# Patient Record
Sex: FEMALE | Race: BLACK OR AFRICAN AMERICAN | NOT HISPANIC OR LATINO | Employment: UNEMPLOYED | URBAN - METROPOLITAN AREA
[De-identification: names, ages, dates, MRNs, and addresses within clinical notes are randomized per-mention and may not be internally consistent; named-entity substitution may affect disease eponyms.]

---

## 2017-10-31 ENCOUNTER — HISTORICAL (OUTPATIENT)
Dept: RADIOLOGY | Facility: HOSPITAL | Age: 57
End: 2017-10-31

## 2020-07-23 ENCOUNTER — HISTORICAL (OUTPATIENT)
Dept: ADMINISTRATIVE | Facility: HOSPITAL | Age: 60
End: 2020-07-23

## 2020-10-12 ENCOUNTER — HISTORICAL (OUTPATIENT)
Dept: ADMINISTRATIVE | Facility: HOSPITAL | Age: 60
End: 2020-10-12

## 2020-11-09 ENCOUNTER — HISTORICAL (OUTPATIENT)
Dept: ADMINISTRATIVE | Facility: HOSPITAL | Age: 60
End: 2020-11-09

## 2021-12-16 ENCOUNTER — HISTORICAL (OUTPATIENT)
Dept: ADMINISTRATIVE | Facility: HOSPITAL | Age: 61
End: 2021-12-16

## 2022-04-10 ENCOUNTER — HISTORICAL (OUTPATIENT)
Dept: ADMINISTRATIVE | Facility: HOSPITAL | Age: 62
End: 2022-04-10
Payer: COMMERCIAL

## 2022-04-29 VITALS
HEIGHT: 63 IN | DIASTOLIC BLOOD PRESSURE: 82 MMHG | HEIGHT: 63 IN | BODY MASS INDEX: 25.52 KG/M2 | SYSTOLIC BLOOD PRESSURE: 123 MMHG | DIASTOLIC BLOOD PRESSURE: 77 MMHG | WEIGHT: 139.56 LBS | SYSTOLIC BLOOD PRESSURE: 111 MMHG | WEIGHT: 144 LBS | DIASTOLIC BLOOD PRESSURE: 81 MMHG | WEIGHT: 139.56 LBS | BODY MASS INDEX: 24.73 KG/M2 | BODY MASS INDEX: 24.73 KG/M2 | SYSTOLIC BLOOD PRESSURE: 118 MMHG | BODY MASS INDEX: 24.73 KG/M2 | HEIGHT: 63 IN | HEIGHT: 63 IN | WEIGHT: 139.56 LBS

## 2022-05-02 NOTE — HISTORICAL [facility]
This is a historical note converted from [employer]. Formatting and pictures may have been removed.  Please reference [employer] for original formatting and attached multimedia. Chief Complaint  F/u L TKA 10/27/20, pt wants to check her knee before possibly losing insurance, pt states she gets pain sometimes when she stands or walk for a period of time, requesting voteran gel for pain, asking if she should wear a knee brace for pain  History of Present Illness  Patient presents to clinic for repeat evaluation of left knee.? She had a TKA on 10/27/2020.? She has intermittent pain when she stands or walks for period of time?and would like reassurance that her knee is okay.? She would like a prescription for Voltaren gel.? She denies any injuries or inciting event since her last visit.?  Physical Exam  Vitals & Measurements  T:?36.5? ?C (Oral)?  HT:?160.00?cm? WT:?63.300?kg? BMI:?24.73?  The patient is well-nourished well-developed, no apparent distress pleasant and cooperative. Examination of the left lower extremity compartments are soft and warm. Skin is intact. There is no evidence of DVT or infection. There is no effusion. There is no erythema. ?Nontender to palpation,?left knee range of motion is 0-120. The knee is stable to exam with various and valgus stressing. ?Incision is well-healed. ?Patella tracking appropriately. Neurovascularly intact distally.? X-rays:?3 views of the?left knee demonstrate?arthroplasty hardware in appropriate?placement without signs of loosening or infection.  Assessment/Plan  1.?History of total left knee replacement?Z96.652  ?Physical exam and imaging findings cussed with patient.? She has done very well with her?left knee.? She will continue low impact activities.? Prescription for Voltaren gel given today.? I would like the patient to call with any problems or difficulties.  Ordered:  diclofenac topical, 1 rodriguez, TOP, QID, PRN PRN as needed for pain, # 100 gm, 0 Refill(s), Pharmacy: [id number], 160, cm, Height/Length Dosing, 12/16/21 9:12:00 CST, 63.3, kg, Weight Dosing, 12/16/21 9:12:00 CST  Office/Outpatient Visit Level 3 Established [id number] PC, History of total left knee replacement, Orthopaedics Clinic, 12/16/21 9:25:00 CST  ?   Problem List/Past Medical History  Ongoing  Arthritis  Clearing throat - hawking  Dysphagia  Fibromyalgia  Gastroesophageal reflux disease  High cholesterol  Hypercholesterolemia  Visual impairment  Historical  No qualifying data  Procedure/Surgical History  KWAN B[place]REGO Total Knee Arthroplasty (Left) (10/27/2020)  Replacement of Left Knee Joint with Synthetic Substitute, Uncemented, Open Approach (10/27/2020)  Robotic Assisted Procedure of Lower Extremity, Open Approach (10/27/2020)  RIGHT KNEE REPLACEMENT (2015)  Cholecystectomy  ESOPHAGUS STRETCHED X2   Medications  aspirin 81 mg oral Delayed Release (EC) tablet, 81 mg= 1 tab(s), Oral, BID  Co Q-10 100 mg oral capsule, 100 mg, Oral, Daily  diclofenac 1% topical gel, 1 rodriguez, TOP, QID, PRN  diclofenac sodium 75 mg oral delayed release tablet, 75 mg= 1 tab(s), Oral, BID  gabapentin 600 mg oral tablet, 600 mg= 1 tab(s), Oral, BID,? ?Still taking, not as prescribed: Taking only one tab BID  Norco 5 mg-325 mg oral tablet, 1 tab(s), Oral, q6hr, PRN,? ?Not taking: duplicate  Norco 5 mg-325 mg oral tablet, 1 tab(s), Oral, q6hr, PRN,? ?Not taking  pravastatin 40 mg oral tablet, 40 mg= 1 tab(s), Oral, Daily  Prempro 0.625 mg-5 mg oral tablet, 1 tab(s), Oral, Daily  sulfamethoxazole-trimethoprim 800 mg-160 mg oral tablet, 1 tab(s), Oral, BID,? ?Not Taking, Completed Rx  traMADol 50 mg oral tablet, 50 mg= 1 tab(s), Oral, q4hr,? ?Not Taking, Completed Rx  triamcinolone 0.5% topical cream, 1 rodriguez, TOP, BID  Tylenol, 500 mg= 1 tab(s), Oral, q4hr  Allergies  No Known Allergies  Social History  Abuse/Neglect  No, No, Yes, 12/16/2021  Employment/School  Disabled, 10/13/2020  Home/Environment  Lives with Spouse. Living situation:  Home/Independent., 09/14/2020  Nutrition/Health  Regular, 10/13/2020  Sexual  Sexually active: Yes., 10/13/2020  Tobacco  Never (less than 100 in lifetime), N/A, 12/16/2021  Family History  Cancer: Father.  Diabetes mellitus type 2: Mother, Sister and Brother.  Heart failure.: Mother.  Renal failure syndrome.: Mother.  Rheumatoid arthritis: Mother.  Health Maintenance  Health Maintenance  ???Pending?(in the next year)  ??? ??Due?  ??? ? ? ?Aspirin Therapy for CVD Prevention due??10/29/21??and every 1??year(s)  ??? ? ? ?Medicare Annual Wellness Exam due??12/16/21??and every 1??year(s)  ??? ? ? ?Tetanus Vaccine due??12/16/21??and every 10??year(s)  ??? ? ? ?Zoster Vaccine due??12/16/21??Unknown Frequency  ??? ??Due In Future?  ??? ? ? ?Obesity Screening not due until??01/01/22??and every 1??year(s)  ??? ? ? ?Alcohol Misuse Screening not due until??01/02/22??and every 1??year(s)  ??? ? ? ?Diabetes Screening not due until??09/27/22??and every 1??year(s)  ??? ? ? ?Breast Cancer Screening not due until??10/06/22??and every 2??year(s)  ??? ? ? ?Blood Pressure Screening not due until??10/13/22??and every 1??year(s)  ???Satisfied?(in the past 1 year)  ??? ??Satisfied?  ??? ? ? ?ADL Screening on??12/16/21.??Satisfied by Jackelin Bingham  ??? ? ? ?Alcohol Misuse Screening on??05/13/21.??Satisfied by Mariely Russell L. L.  ??? ? ? ?Blood Pressure Screening on??05/13/21.??Satisfied by Mariely Russell L. L.  ??? ? ? ?Body Mass Index Check on??12/16/21.??Satisfied by Jackelin Bingham  ??? ? ? ?Depression Screening on??12/16/21.??Satisfied by Jackelin Bingham  ??? ? ? ?Influenza Vaccine on??12/16/21.??Satisfied by Jackelin Bingham  ??? ? ? ?Obesity Screening on??12/16/21.??Satisfied by Jackelin Bingham  ?

## 2022-05-02 NOTE — HISTORICAL [facility]
This is a historical note converted from [employer]. Formatting and pictures may have been removed.  Please reference [employer] for original formatting and attached multimedia. Chief Complaint  Pt c/o right shoulder pain that radiates down into right arm. Pt had right shoulder injection 5/28/2020. Pt also c/o left knee pain and weakness and is ambulating with cane today.  History of Present Illness  Patient returns today for repeat exam. ?Patient continues to have right shoulder pain and left knee pain. ?Patient states her knee continues to give out. ?She was told in the past she needs a knee replacement. ?She tried rest medication, also had an injection earlier this year without relief. ?She is ambulating with her cane.? She also had a injection of her right shoulder last visit, continues to have pain especially with overhead activities.  Physical Exam  Vitals & Measurements  T:?98.0? ?F (Oral)? HR:?77(Peripheral)? BP:?118/82?  HT:?160?cm? WT:?65.31?kg? BMI:?25.51?  Left lower extremity compartments are soft and warm. ?Skin is intact. ?There is no signs or symptoms of DVT or infection. ?She does have a valgus deformity?of her left knee. ?She is almost correctable to neutral. ?She does have a grinding sensation with flexion extension.? Her motion is 5 to 105 degrees. ?She is otherwise stable to stressing she does walk antalgic gait she is neurovascular tact distally. ?Examination the right shoulder?she is tender along the lateral aspect mildly positive Hawkin sign negative drop arm she is able to forward flex and abduct 140 degrees she is otherwise stable to stressing she is neurovascular tact distally.? X-rays 3 views left knee demonstrates?severe lateral compartment arthritic changes?with bone spurring, sclerosis.  Assessment/Plan  1.?Osteoarthritis of left knee?M17.12  ?At this time we discussed her physical exam and x-ray findings. ?In regards to her left knee, she has failed 1 conservative treatments. ?She would like  to proceed with a knee replacement,?the risk-benefit return discussed with the patient in detail?downtime rehab process, all questions were answered.? She has had 1 on the right knee and has done well. ?We will set this up at her convenience. ?In regards to her right shoulder she will continue?her?therapy exercises.  Ordered:  Clinic Follow up, *Est. 07/30/20 3:00:00 CDT, Order for future visit, Osteoarthritis of left knee  Right rotator cuff tendonitis  Impingement syndrome of right shoulder region, Orthopaedics  Office/Outpatient Visit Level 3 Established [id number] PC, Osteoarthritis of left knee  Right rotator cuff tendonitis  Impingement syndrome of right shoulder region, [facility], 07/23/20 8:34:00 CDT  Schedule Surgery, left tka, preop next week, 07/23/20 8:34:00 CDT, Osteoarthritis of left knee  Right rotator cuff tendonitis  Impingement syndrome of right shoulder region  ?  2.?Right rotator cuff tendonitis?M75.81  Ordered:  Clinic Follow up, *Est. 07/30/20 3:00:00 CDT, Order for future visit, Osteoarthritis of left knee  Right rotator cuff tendonitis  [facility]  Office/Outpatient Visit Level 3 Established [id number] PC, Osteoarthritis of left knee  Right rotator cuff tendonitis  Impingement syndrome of right shoulder region, [facility], 07/23/20 8:34:00 CDT  Schedule Surgery, left tka, preop next week, 07/23/20 8:34:00 CDT, Osteoarthritis of left knee  Right rotator cuff tendonitis  Impingement syndrome of right shoulder region  ?  3.?Impingement syndrome of right shoulder region?M75.41  Ordered:  Clinic Follow up, *Est. 07/30/20 3:00:00 CDT, Order for future visit, Osteoarthritis of left knee  Right rotator cuff tendonitis  [facility]  Office/Outpatient Visit Level 3 Established [id number] PC, Osteoarthritis of left knee  Right rotator cuff tendonitis  Impingement syndrome of  right shoulder region, Orthopaedics Clinic, 07/23/20 8:34:00 CDT  Schedule Surgery, left tka, preop next week, 07/23/20 8:34:00 CDT, Osteoarthritis of left knee  Right rotator cuff tendonitis  Impingement syndrome of right shoulder region  ?  Orders:  XR Knee Left 3 Views, Routine, 07/23/20 8:13:00 CDT, None, Ambulatory, Rad Type, Left knee pain, Not Scheduled, 07/23/20 8:13:00 CDT  Referrals  Clinic Follow up, *Est. 07/30/20 3:00:00 CDT, Order for future visit, Osteoarthritis of left knee  Right rotator cuff tendonitis  [facility]   Problem List/Past Medical History  Ongoing  Fibromyalgia  High cholesterol  Historical  No qualifying data  Procedure/Surgical History  RIGHT KNEE REPLACEMENT (2015)  Cholecystectomy  ESOPHAGUS STRETCHED X2   Medications  atorvastatin 80 mg oral tablet, 80 mg= 1 tab(s), Oral, qPM,? ?Not taking  gabapentin 400 mg oral capsule, 400 mg= 1 cap(s), Oral, TID  meloxicam 7.5 mg oral tablet, 7.5 mg= 1 tab(s), Oral, Daily  pravastatin 40 mg oral tablet, 40 mg= 1 tab(s), Oral, Daily  triamcinolone 0.5% topical cream, 1 rodriguez, TOP, BID  Allergies  No Known Allergies  Social History  Abuse/Neglect  No, 07/23/2020  No, No, Yes, 05/28/2020  Tobacco  Never (less than 100 in lifetime), N/A, 07/23/2020  Never (less than 100 in lifetime), N/A, 05/28/2020  Family History  Cancer: Father.  Diabetes mellitus type 2: Mother, Sister and Brother.  Heart failure.: Mother.  Renal failure syndrome.: Mother.  Rheumatoid arthritis: Mother.  Health Maintenance  Health Maintenance  ???Pending?(in the next year)  ??? ??Due?  ??? ? ? ?ADL Screening due??07/23/20??and every 1??year(s)  ??? ? ? ?Aspirin Therapy for CVD Prevention due??07/23/20??and every 1??year(s)  ??? ? ? ?Colorectal Screening due??07/23/20??and every 10??year(s)  ??? ? ? ?Depression Screening due??07/23/20??and every?  ??? ? ? ?Influenza Vaccine due??07/23/20??and every?  ??? ? ? ?Tetanus Vaccine  due??07/23/20??and every 10??year(s)  ??? ? ? ?Zoster Vaccine due??07/23/20??and every?  ??? ??Due In Future?  ??? ? ? ?Obesity Screening not due until??01/01/21??and every 1??year(s)  ??? ? ? ?Alcohol Misuse Screening not due until??01/02/21??and every 1??year(s)  ??? ? ? ?Diabetes Screening not due until??03/10/21??and every 1??year(s)  ??? ? ? ?Blood Pressure Screening not due until??05/28/21??and every 1??year(s)  ??? ? ? ?Body Mass Index Check not due until??05/28/21??and every 1??year(s)  ???Satisfied?(in the past 1 year)  ??? ??Satisfied?  ??? ? ? ?Alcohol Misuse Screening on??05/28/20.??Satisfied by Belkis Vera LPN  ??? ? ? ?Blood Pressure Screening on??07/23/20.??Satisfied by Ale Fabian  ??? ? ? ?Body Mass Index Check on??07/23/20.??Satisfied by Ale Fabian  ??? ? ? ?Obesity Screening on??07/23/20.??Satisfied by Ale Fabian  ?

## 2022-05-12 ENCOUNTER — HOSPITAL ENCOUNTER (OUTPATIENT)
Dept: RADIOLOGY | Facility: CLINIC | Age: 62
Discharge: HOME | End: 2022-05-12
Attending: ORTHOPAEDIC SURGERY
Payer: COMMERCIAL

## 2022-05-12 ENCOUNTER — OFFICE VISIT (OUTPATIENT)
Dept: ORTHOPEDICS | Facility: CLINIC | Age: 62
End: 2022-05-12
Payer: COMMERCIAL

## 2022-05-12 VITALS
DIASTOLIC BLOOD PRESSURE: 77 MMHG | WEIGHT: 142 LBS | BODY MASS INDEX: 25.16 KG/M2 | HEART RATE: 65 BPM | HEIGHT: 63 IN | SYSTOLIC BLOOD PRESSURE: 115 MMHG

## 2022-05-12 DIAGNOSIS — Z96.652 HISTORY OF LEFT KNEE REPLACEMENT: Primary | ICD-10-CM

## 2022-05-12 DIAGNOSIS — Z96.652 HISTORY OF TOTAL KNEE REPLACEMENT, LEFT: ICD-10-CM

## 2022-05-12 DIAGNOSIS — Z96.652 HISTORY OF LEFT KNEE REPLACEMENT: ICD-10-CM

## 2022-05-12 PROCEDURE — 99213 PR OFFICE/OUTPT VISIT, EST, LEVL III, 20-29 MIN: ICD-10-PCS | Mod: ,,, | Performed by: ORTHOPAEDIC SURGERY

## 2022-05-12 PROCEDURE — 99213 OFFICE O/P EST LOW 20 MIN: CPT | Mod: ,,, | Performed by: ORTHOPAEDIC SURGERY

## 2022-05-12 RX ORDER — GABAPENTIN 600 MG/1
600 TABLET ORAL 3 TIMES DAILY
COMMUNITY
Start: 2022-03-17

## 2022-05-12 RX ORDER — DICLOFENAC SODIUM 10 MG/G
GEL TOPICAL
COMMUNITY
Start: 2021-12-16

## 2022-05-12 NOTE — PROGRESS NOTES
Subjective:    CC: Pain of the Left Knee and Knee Pain (F/u L TKA 10/27/20, pt states her knee still give her trouble, c/o she gets pain/swelling after walking for awhile, tylenol/ice/topical gel for pain)       HPI:  Patient returns today for repeat exam.  Patient is when half years from her left total knee arthroplasty.  Patient states she is doing well, occasional soreness and swelling after long walking, especially after the last vestibule.  Otherwise she is not taking any medication, she does have a history of fibromyalgia, occasional Tylenol.    ROS: Refer to HPI for pertinent ROS. All other 12 point systems negative.    Objective:    Physical Exam:  Left lower extremity compartment soft and warm.  Skin is intact.  There is no signs symptoms of DVT or infection.  Her incision well healed her motion is 0-125 degrees she is stable to stressing patella tracked appropriately she is neurovascular intact distally.    Images:  X-rays three views left knee demonstrate a well-aligned total knee arthroplasty. Images Reviewed and discussed with patient.    Assessment:  1. History of left knee replacement  - X-Ray Knee Complete 4 or More Views Left; Future    2. History of total knee replacement, left        Plan:  At this time we discussed her physical exam and x-ray findings.  She has healed nicely she will continue low-impact activities we have discussed GI and  precautions.  I would like see back with any problems or difficulties.    Follow UP: Follow up if symptoms worsen or fail to improve.

## 2022-10-04 ENCOUNTER — TELEPHONE (OUTPATIENT)
Dept: ORTHOPEDICS | Facility: CLINIC | Age: 62
End: 2022-10-04
Payer: COMMERCIAL

## 2022-10-04 NOTE — TELEPHONE ENCOUNTER
PATIENT WOULD LIKE ANOTHER HANDICAP TAG. SHE STATES THAT YOU TOLD HER TO CALL WHEN SHE IS READY FOR ANOTHER TAG.       OKAY TO PROCEED?

## 2023-10-09 ENCOUNTER — TELEPHONE (OUTPATIENT)
Dept: ORTHOPEDICS | Facility: CLINIC | Age: 63
End: 2023-10-09
Payer: COMMERCIAL

## 2023-10-09 NOTE — TELEPHONE ENCOUNTER
Patient called stating that her handicap tag is about to . She stated that she needed another rx for it.     Okay to proceed?

## 2023-10-16 ENCOUNTER — HOSPITAL ENCOUNTER (OUTPATIENT)
Dept: RADIOLOGY | Facility: CLINIC | Age: 63
Discharge: HOME | End: 2023-10-16
Attending: ORTHOPAEDIC SURGERY
Payer: MEDICARE

## 2023-10-16 ENCOUNTER — OFFICE VISIT (OUTPATIENT)
Dept: ORTHOPEDICS | Facility: CLINIC | Age: 63
End: 2023-10-16
Payer: MEDICARE

## 2023-10-16 VITALS
SYSTOLIC BLOOD PRESSURE: 120 MMHG | WEIGHT: 142 LBS | HEIGHT: 63 IN | DIASTOLIC BLOOD PRESSURE: 82 MMHG | HEART RATE: 74 BPM | BODY MASS INDEX: 25.16 KG/M2

## 2023-10-16 DIAGNOSIS — Z96.652 HISTORY OF LEFT KNEE REPLACEMENT: Primary | ICD-10-CM

## 2023-10-16 DIAGNOSIS — Z96.652 HISTORY OF LEFT KNEE REPLACEMENT: ICD-10-CM

## 2023-10-16 PROCEDURE — 3074F SYST BP LT 130 MM HG: CPT | Mod: CPTII,,, | Performed by: ORTHOPAEDIC SURGERY

## 2023-10-16 PROCEDURE — 3008F PR BODY MASS INDEX (BMI) DOCUMENTED: ICD-10-PCS | Mod: CPTII,,, | Performed by: ORTHOPAEDIC SURGERY

## 2023-10-16 PROCEDURE — 1159F PR MEDICATION LIST DOCUMENTED IN MEDICAL RECORD: ICD-10-PCS | Mod: CPTII,,, | Performed by: ORTHOPAEDIC SURGERY

## 2023-10-16 PROCEDURE — 73564 XR KNEE COMP 4 OR MORE VIEWS LEFT: ICD-10-PCS | Mod: LT,,, | Performed by: ORTHOPAEDIC SURGERY

## 2023-10-16 PROCEDURE — 3079F PR MOST RECENT DIASTOLIC BLOOD PRESSURE 80-89 MM HG: ICD-10-PCS | Mod: CPTII,,, | Performed by: ORTHOPAEDIC SURGERY

## 2023-10-16 PROCEDURE — 1159F MED LIST DOCD IN RCRD: CPT | Mod: CPTII,,, | Performed by: ORTHOPAEDIC SURGERY

## 2023-10-16 PROCEDURE — 3079F DIAST BP 80-89 MM HG: CPT | Mod: CPTII,,, | Performed by: ORTHOPAEDIC SURGERY

## 2023-10-16 PROCEDURE — 1160F RVW MEDS BY RX/DR IN RCRD: CPT | Mod: CPTII,,, | Performed by: ORTHOPAEDIC SURGERY

## 2023-10-16 PROCEDURE — 1160F PR REVIEW ALL MEDS BY PRESCRIBER/CLIN PHARMACIST DOCUMENTED: ICD-10-PCS | Mod: CPTII,,, | Performed by: ORTHOPAEDIC SURGERY

## 2023-10-16 PROCEDURE — 99213 PR OFFICE/OUTPT VISIT, EST, LEVL III, 20-29 MIN: ICD-10-PCS | Mod: ,,, | Performed by: ORTHOPAEDIC SURGERY

## 2023-10-16 PROCEDURE — 3074F PR MOST RECENT SYSTOLIC BLOOD PRESSURE < 130 MM HG: ICD-10-PCS | Mod: CPTII,,, | Performed by: ORTHOPAEDIC SURGERY

## 2023-10-16 PROCEDURE — 3008F BODY MASS INDEX DOCD: CPT | Mod: CPTII,,, | Performed by: ORTHOPAEDIC SURGERY

## 2023-10-16 PROCEDURE — 73564 X-RAY EXAM KNEE 4 OR MORE: CPT | Mod: LT,,, | Performed by: ORTHOPAEDIC SURGERY

## 2023-10-16 PROCEDURE — 99213 OFFICE O/P EST LOW 20 MIN: CPT | Mod: ,,, | Performed by: ORTHOPAEDIC SURGERY

## 2023-10-16 RX ORDER — DICLOFENAC SODIUM 75 MG/1
TABLET, DELAYED RELEASE ORAL
COMMUNITY

## 2023-10-16 NOTE — PROGRESS NOTES
"Subjective:    CC: Follow-up of the Left Knee and Follow-up (L TKA 10/27/20 - pt would like a re-eval for a handicap tag. Pt states that her knee does hurt at time. )       HPI:  Patient comes in today for follow up for her left knee.  She is a proximally 3 years from her left total knee arthroplasty.  Patient states she is doing very well, she remains be very happy with her results.  She does have lower back issues, she is been treated outside facility, she denies any swelling or erythema about her left knee.  She is back to her normal activities.    ROS: Refer to HPI for pertinent ROS. All other 12 point systems negative.    Objective:  Vitals:    10/16/23 0901   BP: 120/82   Pulse: 74   Weight: 64.4 kg (142 lb)   Height: 5' 3" (1.6 m)        Physical Exam:  Left lower extremity compartment soft and warm.  Skin is intact.  There is no signs symptoms of DVT or infection.  Her incision well healed there is no joint effusion there is no swelling there is no erythema she is nontender her motion is 0-125 degrees she is stable to stressing, patella tracked appropriately, she walks with a normal gait, neurovascular intact distally.    Images:  X-rays four views of the left knee demonstrate a well-aligned total knee arthroplasty. Images Reviewed and discussed with patient.    Assessment:  1. History of left knee replacement  - X-Ray Knee Complete 4 or More Views Left; Future        Plan:  At this time we discussed her physical exam and x-ray findings.  She is healed nicely she will continue low-impact activities, I would like see back with any problems or difficulties.    Follow UP: No follow-ups on file.              "

## 2024-11-11 ENCOUNTER — OFFICE VISIT (OUTPATIENT)
Dept: ORTHOPEDICS | Facility: CLINIC | Age: 64
End: 2024-11-11
Payer: MEDICARE

## 2024-11-11 DIAGNOSIS — Z96.652 HISTORY OF LEFT KNEE REPLACEMENT: Primary | ICD-10-CM

## 2024-11-11 PROCEDURE — 1160F RVW MEDS BY RX/DR IN RCRD: CPT | Mod: CPTII,,, | Performed by: ORTHOPAEDIC SURGERY

## 2024-11-11 PROCEDURE — 99213 OFFICE O/P EST LOW 20 MIN: CPT | Mod: ,,, | Performed by: ORTHOPAEDIC SURGERY

## 2024-11-11 PROCEDURE — 1159F MED LIST DOCD IN RCRD: CPT | Mod: CPTII,,, | Performed by: ORTHOPAEDIC SURGERY

## 2024-11-11 NOTE — PROGRESS NOTES
Subjective:    CC: Follow-up of the Left Knee (L knee pain. Pt states she feels little hard knots and wanted to get that checked. Not having any pain or swelling. Does exercises in the morning. No recent falls.)       HPI:  Patient returns today for repeat exam.  Patient is a proximally 4 years now from her left total knee arthroplasty.  She denies any trauma or any injury.  She denies any pain or discomfort about her knee.  Patient states she felt a knot over her knee, nonpainful no swelling, and forgot to mention it last time, she was seen last year, had x-rays.  Patient states she is doing well.    ROS: Refer to HPI for pertinent ROS. All other 12 point systems negative.    Objective:  There were no vitals filed for this visit.     Physical Exam:  Left lower extremity compartment soft and warm.  Skin is intact.  There is no signs symptoms of DVT or infection.  Her incision is well healed there is no swelling there is no erythema there is no joint effusion her motion is 0-130 degrees she is stable to stressing.  She does have a small suspected palpable retained suture at the end of her patella area.  Proximally 1-2 mm.  No other masses appreciated no swelling.    Images: . Images Reviewed and discussed with patient.    Assessment:  1. History of left knee replacement        Plan:  At this time we discussed her physical exam and previous x-ray findings.  We will continue conservative treatment, she has healed very nicely from her knee replacement she will continue low-impact activities, I would like see back with any problems or difficulties.    Follow UP: No follow-ups on file.

## 2025-03-31 ENCOUNTER — HOSPITAL ENCOUNTER (OUTPATIENT)
Dept: RADIOLOGY | Facility: CLINIC | Age: 65
Discharge: HOME | End: 2025-03-31
Attending: ORTHOPAEDIC SURGERY
Payer: MEDICARE

## 2025-03-31 ENCOUNTER — OFFICE VISIT (OUTPATIENT)
Dept: ORTHOPEDICS | Facility: CLINIC | Age: 65
End: 2025-03-31
Payer: MEDICARE

## 2025-03-31 VITALS
HEART RATE: 68 BPM | HEIGHT: 63 IN | SYSTOLIC BLOOD PRESSURE: 131 MMHG | DIASTOLIC BLOOD PRESSURE: 84 MMHG | BODY MASS INDEX: 24.83 KG/M2 | WEIGHT: 140.13 LBS

## 2025-03-31 DIAGNOSIS — Z96.652 HISTORY OF LEFT KNEE REPLACEMENT: ICD-10-CM

## 2025-03-31 DIAGNOSIS — Z96.651 HISTORY OF TOTAL KNEE ARTHROPLASTY, RIGHT: ICD-10-CM

## 2025-03-31 DIAGNOSIS — M79.7 FIBROMYALGIA: ICD-10-CM

## 2025-03-31 DIAGNOSIS — Z96.652 HISTORY OF LEFT KNEE REPLACEMENT: Primary | ICD-10-CM

## 2025-03-31 DIAGNOSIS — M06.9 RHEUMATOID ARTHRITIS FLARE: ICD-10-CM

## 2025-03-31 PROCEDURE — 3079F DIAST BP 80-89 MM HG: CPT | Mod: CPTII,,, | Performed by: ORTHOPAEDIC SURGERY

## 2025-03-31 PROCEDURE — 1159F MED LIST DOCD IN RCRD: CPT | Mod: CPTII,,, | Performed by: ORTHOPAEDIC SURGERY

## 2025-03-31 PROCEDURE — 3075F SYST BP GE 130 - 139MM HG: CPT | Mod: CPTII,,, | Performed by: ORTHOPAEDIC SURGERY

## 2025-03-31 PROCEDURE — 1160F RVW MEDS BY RX/DR IN RCRD: CPT | Mod: CPTII,,, | Performed by: ORTHOPAEDIC SURGERY

## 2025-03-31 PROCEDURE — 99214 OFFICE O/P EST MOD 30 MIN: CPT | Mod: ,,, | Performed by: ORTHOPAEDIC SURGERY

## 2025-03-31 PROCEDURE — 3008F BODY MASS INDEX DOCD: CPT | Mod: CPTII,,, | Performed by: ORTHOPAEDIC SURGERY

## 2025-03-31 PROCEDURE — 73564 X-RAY EXAM KNEE 4 OR MORE: CPT | Mod: LT,,, | Performed by: ORTHOPAEDIC SURGERY

## 2025-03-31 RX ORDER — TIZANIDINE 4 MG/1
4 TABLET ORAL NIGHTLY
COMMUNITY
Start: 2025-03-17

## 2025-03-31 RX ORDER — METHYLPREDNISOLONE 4 MG/1
TABLET ORAL
Qty: 1 EACH | Refills: 0 | Status: SHIPPED | OUTPATIENT
Start: 2025-03-31

## 2025-03-31 RX ORDER — OLOPATADINE HYDROCHLORIDE OPHTHALMIC 2 MG/ML
1 SOLUTION OPHTHALMIC EVERY MORNING
COMMUNITY
Start: 2025-03-20

## 2025-03-31 NOTE — PROGRESS NOTES
"Subjective:    CC: Pain of the Left Knee (Pt states pain is constant, every day, through the night. Pt has to put pillow between her knees. Some swelling in the Lt leg. No recent falls or injuries. PT takes Tazidine to help sleep, but still waking up in the middle of night.)       HPI:  Patient comes in today complaining of bilateral knee pain bilateral shoulder pain, she states she is aching all over.  She does have a history of rheumatoid arthritis, she is currently looking for a rheumatologist.  She is not taking any pain medications.  She denies any trauma or specific injury.  She has had a previous left total knee arthroplasty a proximally 4-5 years ago.  She has tried rest medication without much relief.    ROS: Refer to HPI for pertinent ROS. All other 12 point systems negative.    Objective:  Vitals:    03/31/25 1100   BP: 131/84   BP Location: Right arm   Patient Position: Sitting   Pulse: 68   Weight: 63.5 kg (140 lb 1.6 oz)   Height: 5' 3" (1.6 m)        Physical Exam:  Bilateral lower extremities compartment soft and warm.  Skin is intact.  There is no signs symptoms of DVT or infection.  Previous incisions are well healed her motion is 0-120 on the left and right she is stable to stressing, patella tracked appropriately, neurovascular intact distally.    Images:  X-rays four views left knee demonstrate a well-aligned total knee arthroplasty. Images Reviewed and discussed with patient.    Assessment:  1. History of left knee replacement  - X-Ray Knee Complete 4 or More Views Left; Future  - Ambulatory Referral/Consult to Physical Therapy; Future    2. History of total knee arthroplasty, right  - Ambulatory Referral/Consult to Physical Therapy; Future    3. Fibromyalgia  - Ambulatory Referral/Consult to Physical Therapy; Future    4. Rheumatoid arthritis flare        Plan:  At this time we discussed her physical exam and x-ray findings.  She has done very well with her left knee replacement.  We have " discussed formal therapy for strengthening, we have also discussed a Medrol Dosepak with appropriate precautions.  We have also discussed referral to a rheumatologist, we will set this up at her convenience.  I would like see back in 3 months to see how she is progressing.    Follow UP: No follow-ups on file.

## 2025-04-09 DIAGNOSIS — M06.9 RHEUMATOID ARTHRITIS FLARE: ICD-10-CM

## 2025-04-09 DIAGNOSIS — M79.7 FIBROMYALGIA: Primary | ICD-10-CM

## 2025-07-31 ENCOUNTER — OFFICE VISIT (OUTPATIENT)
Dept: ORTHOPEDICS | Facility: CLINIC | Age: 65
End: 2025-07-31
Payer: MEDICARE

## 2025-07-31 VITALS
HEIGHT: 63 IN | HEART RATE: 66 BPM | DIASTOLIC BLOOD PRESSURE: 69 MMHG | WEIGHT: 140 LBS | SYSTOLIC BLOOD PRESSURE: 103 MMHG | BODY MASS INDEX: 24.8 KG/M2

## 2025-07-31 DIAGNOSIS — M06.9 RHEUMATOID ARTHRITIS FLARE: ICD-10-CM

## 2025-07-31 DIAGNOSIS — M79.7 FIBROMYALGIA: ICD-10-CM

## 2025-07-31 DIAGNOSIS — Z96.652 HISTORY OF LEFT KNEE REPLACEMENT: Primary | ICD-10-CM

## 2025-07-31 PROCEDURE — 3074F SYST BP LT 130 MM HG: CPT | Mod: CPTII,,,

## 2025-07-31 PROCEDURE — 99213 OFFICE O/P EST LOW 20 MIN: CPT | Mod: ,,,

## 2025-07-31 PROCEDURE — 3008F BODY MASS INDEX DOCD: CPT | Mod: CPTII,,,

## 2025-07-31 PROCEDURE — 3078F DIAST BP <80 MM HG: CPT | Mod: CPTII,,,

## 2025-07-31 PROCEDURE — 1159F MED LIST DOCD IN RCRD: CPT | Mod: CPTII,,,

## 2025-07-31 PROCEDURE — 1160F RVW MEDS BY RX/DR IN RCRD: CPT | Mod: CPTII,,,

## 2025-07-31 NOTE — PROGRESS NOTES
Subjective:    CC: Follow-up and Pain of the Left Knee (F/U L knee pain. Pt states she's no longer in PT due to the cost of it. Knee is doing better than it was. Taking magnesium and has been getting relief with pain at night. Denies swelling. No numbness. Taking OTC meds when needed and diclofenac when pain is severe.)       HPI:  Patient presents to clinic for repeat evaluation of left knee.  She has a history of a previous left knee replacement years ago.  She did attend physical therapy but recently discontinued due to financial burden.  She states her knee is overall doing better than it was.  She finds that taking magnesium has been helping her get relief at night.  She denies any numbness or swelling.  Utilizing OTC meds occasionally as well as diclofenac.  She does have a history of rheumatoid arthritis as well as fibromyalgia and states that most of her issue in the leg is fatigue.  Denies any range of motion issues.    ROS: Refer to HPI for pertinent ROS. All other 12 point systems negative.    Past Medical History:   Diagnosis Date    Arthritis     Fibromyalgia         Past Surgical History:   Procedure Laterality Date    CHOLECYSTECTOMY      HYSTERECTOMY      JOINT REPLACEMENT  2015/2020    Right 2015/Left 2020    TUBAL LIGATION          Medications Ordered Prior to Encounter[1]     Review of patient's allergies indicates:   Allergen Reactions    Aleve [naproxen sodium] Nausea Only       Objective:    Vitals:    07/31/25 0825   BP: 103/69   Pulse: 66        Physical Exam:  Left lower extremity compartments are soft and warm.  Skin is intact.  There are no signs or symptoms of DVT or infection.  Her previous incision is well healed.  She is nontender to palpation about the knee.  Left knee range of motion is 0-125 degrees.  Patella tracking appropriately.  Neurovascularly intact distally.    Images:  Previous Images Reviewed and discussed with patient.    Assessment:  1. History of left knee  replacement    2. Fibromyalgia    3. Rheumatoid arthritis flare       Plan:  Physical exam and previous imaging findings again discussed with the patient.  She is overall doing very well from a total knee replacement standpoint.  I believe most of her issues today are related to her underlying fibromyalgia.  Recommend she follow up with PCP versus rheumatology.  Encouraged continued home exercise program to preserve range of motion and strength.  I would like to see the patient back with any problems or difficulties.    Follow up: Follow up if symptoms worsen or fail to improve.                 [1]   Current Outpatient Medications on File Prior to Visit   Medication Sig Dispense Refill    diclofenac (VOLTAREN) 75 MG EC tablet       diclofenac sodium (VOLTAREN) 1 % Gel Apply topically.      gabapentin (NEURONTIN) 600 MG tablet Take 600 mg by mouth 3 (three) times daily.      methylPREDNISolone (MEDROL DOSEPACK) 4 mg tablet use as directed 1 each 0    olopatadine (PATADAY) 0.2 % Drop Place 1 drop into both eyes every morning.      tiZANidine (ZANAFLEX) 4 MG tablet Take 4 mg by mouth every evening.       No current facility-administered medications on file prior to visit.